# Patient Record
Sex: MALE | URBAN - NONMETROPOLITAN AREA
[De-identification: names, ages, dates, MRNs, and addresses within clinical notes are randomized per-mention and may not be internally consistent; named-entity substitution may affect disease eponyms.]

---

## 2021-08-04 ENCOUNTER — APPOINTMENT (RX ONLY)
Dept: URBAN - NONMETROPOLITAN AREA CLINIC 4 | Facility: CLINIC | Age: 66
Setting detail: DERMATOLOGY
End: 2021-08-04

## 2021-08-04 DIAGNOSIS — D17 BENIGN LIPOMATOUS NEOPLASM: ICD-10-CM

## 2021-08-04 PROBLEM — D17.0 BENIGN LIPOMATOUS NEOPLASM OF SKIN AND SUBCUTANEOUS TISSUE OF HEAD, FACE AND NECK: Status: ACTIVE | Noted: 2021-08-04

## 2021-08-04 PROCEDURE — ? COUNSELING

## 2021-08-04 PROCEDURE — ? DEFER

## 2021-08-04 PROCEDURE — 99202 OFFICE O/P NEW SF 15 MIN: CPT

## 2021-08-04 ASSESSMENT — LOCATION SIMPLE DESCRIPTION DERM: LOCATION SIMPLE: POSTERIOR NECK

## 2021-08-04 ASSESSMENT — LOCATION DETAILED DESCRIPTION DERM: LOCATION DETAILED: RIGHT SUPERIOR POSTERIOR NECK

## 2021-08-04 ASSESSMENT — LOCATION ZONE DERM: LOCATION ZONE: NECK

## 2021-08-04 NOTE — HPI: SKIN LESION (LIPOMA)
How Severe Is Your Lipoma?: mild
Is This A New Presentation, Or A Follow-Up?: Skin Lesion
Additional History: Patient is complaining of nerve pain in the area. This area changes size based on if it is inflamed.

## 2021-08-04 NOTE — PROCEDURE: DEFER
Introduction Text (Please End With A Colon): The following procedure was deferred:excision
Instructions (Optional): 9 cm Because of size and patient complaining of nerve pain in the area referring to Dr. Alejandra
Detail Level: Detailed
Reason To Defer Override: Refer to Dr alexander

## 2023-06-19 ENCOUNTER — OFFICE VISIT (OUTPATIENT)
Dept: URGENT CARE | Facility: CLINIC | Age: 68
End: 2023-06-19
Payer: COMMERCIAL

## 2023-06-19 VITALS
WEIGHT: 245 LBS | BODY MASS INDEX: 31.44 KG/M2 | DIASTOLIC BLOOD PRESSURE: 100 MMHG | TEMPERATURE: 98.7 F | HEART RATE: 90 BPM | RESPIRATION RATE: 20 BRPM | HEIGHT: 74 IN | OXYGEN SATURATION: 97 % | SYSTOLIC BLOOD PRESSURE: 170 MMHG

## 2023-06-19 DIAGNOSIS — R03.0 ELEVATED BLOOD PRESSURE READING: ICD-10-CM

## 2023-06-19 DIAGNOSIS — R42 DIZZINESS: Primary | ICD-10-CM

## 2023-06-19 DIAGNOSIS — M54.50 ACUTE RIGHT-SIDED LOW BACK PAIN, UNSPECIFIED WHETHER SCIATICA PRESENT: ICD-10-CM
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
## 2023-06-19 LAB — SL AMB POCT GLUCOSE BLD: 230

## 2023-06-19 PROCEDURE — 93005 ELECTROCARDIOGRAM TRACING: CPT

## 2023-06-19 PROCEDURE — 82948 REAGENT STRIP/BLOOD GLUCOSE: CPT

## 2023-06-19 PROCEDURE — S9088 SERVICES PROVIDED IN URGENT: HCPCS

## 2023-06-19 PROCEDURE — 99203 OFFICE O/P NEW LOW 30 MIN: CPT

## 2023-06-19 RX ORDER — GLIPIZIDE 10 MG/1
10 TABLET, FILM COATED, EXTENDED RELEASE ORAL DAILY
COMMUNITY

## 2023-06-19 RX ORDER — ASPIRIN 81 MG/1
81 TABLET ORAL DAILY
COMMUNITY

## 2023-06-19 RX ORDER — FLUTICASONE PROPIONATE 50 MCG
SPRAY, SUSPENSION (ML) NASAL
COMMUNITY
Start: 2023-03-18

## 2023-06-19 NOTE — PROGRESS NOTES
"  [address]        NAME: Aundrea Freire is a 75 y o  male  : 1955    MRN: [id number]  DATE: 2023  TIME: 3:10 PM    Assessment and Plan   Dizziness [R42]  1  Dizziness  POCT blood glucose    ECG 12 lead    Transfer to other facility      2  Elevated blood pressure reading  ECG 12 lead    Transfer to other facility      3  Fall, initial encounter  Transfer to other facility      4  Acute right-sided low back pain, unspecified whether sciatica present          Random blood glucose in the clinic was 230  EKG performed at 12:34 and shows heart rate of 88 bpm    MS, QT/QTc 388/469 ms,  MS   MS  Normal sinus rhythm, low voltage QRS  Family member if patient came to the clinic to pick him up and bring him to the ER  She states that he is acting his normal self and typically talks in a slower manner  Denies this being any change from baseline  Family members take him to the ER at this time  Patient Instructions       Go to the emergency room as instructed  Chief Complaint     Chief Complaint   Patient presents with   • Hypertension     Was  at pt and fell blood pressure was taken 190/110   Is having lower back pain due to fall   Dizziness noted          History of Present Illness       43-year-old male patient presents after being at physical therapy next-door with dizziness and fall  His blood pressure was taken at that time and was 190/110  Blood pressure was 170/100 today in the clinic  PT states that since the fall, he has felt \"off\"  PT states that before physical therapy today, he felt \"great\"  PT states that dizziness has been a chronic issue for him  History of vertigo in the past   PT has history of chronic serous otitis media of the left ear  PT sees ENT regularly  PT states that this dizziness does not feel different than usual   He states he was walking into the clinic about 60 feet when he felt dizzy and fell backwards    Does not think he hit his " head   PT states he is now having right lower back pain  Worse with palpation and certain movements  PT states that he slightly felt the room spinning around him  Denies any history of cardiac disease  PT is a type II diabetic on glipizide and metformin, which he took this medication today  PT states he does not regularly check his blood glucose at home  Denies any fever, chills, chest pain, shortness of breath, changes in vision, headaches, numbness/tingling, abdominal pain, nausea, vomiting, diarrhea  PT states that he drove to physical therapy by himself  Came into the  in wheelchair due to dizziness/fall - walks without any assistance prior to today  Review of Systems   Review of Systems   Constitutional: Negative  HENT: Negative  Eyes: Negative  Respiratory: Negative  Cardiovascular: Negative  Gastrointestinal: Negative  Musculoskeletal: Positive for back pain  Skin: Negative  Neurological: Positive for dizziness  Negative for headaches  Current Medications       Current Outpatient Medications:   •  aspirin (ECOTRIN LOW STRENGTH) 81 mg EC tablet, Take 81 mg by mouth daily, Disp: , Rfl:   •  fluticasone (FLONASE) 50 mcg/act nasal spray, ADMINISTER TWO SPRAYS INTO EACH NOSTRIL IN THE MORNING, Disp: , Rfl:   •  glipiZIDE (GLUCOTROL XL) 10 mg 24 hr tablet, Take 10 mg by mouth daily, Disp: , Rfl:   •  LISINOPRIL PO, Take by mouth, Disp: , Rfl:   •  metFORMIN (GLUCOPHAGE) 500 mg tablet, Take 500 mg by mouth 2 (two) times a day with meals, Disp: , Rfl:     Current Allergies     Allergies as of 06/19/2023   • (No Known Allergies)            The following portions of the patient's history were reviewed and updated as appropriate: allergies, current medications, past family history, past medical history, past social history, past surgical history and problem list      Past Medical History:   Diagnosis Date   • Diabetes mellitus ([facility] [address] )    • Hypertension        History reviewed  "No pertinent surgical history  History reviewed  No pertinent family history  Medications have been verified  Objective   /100   Pulse 90   Temp 98 7 °F (37 1 °C)   Resp 20   Ht 6' 1 5\" (1 867 m)   Wt 111 kg (245 lb)   SpO2 97%   BMI 31 89 kg/m²        Physical Exam     Physical Exam  Constitutional:       General: He is awake  He is not in acute distress  Appearance: Normal appearance  He is not ill-appearing or diaphoretic  HENT:      Head: Normocephalic and atraumatic  Right Ear: Tympanic membrane, ear canal and external ear normal       Left Ear: Ear canal and external ear normal       Nose: Nose normal       Mouth/Throat:      Mouth: Mucous membranes are moist       Pharynx: Oropharynx is clear  No posterior oropharyngeal erythema  Eyes:      Extraocular Movements: Extraocular movements intact  Conjunctiva/sclera: Conjunctivae normal       Pupils: Pupils are equal, round, and reactive to light  Cardiovascular:      Rate and Rhythm: Normal rate and regular rhythm  Pulses: Normal pulses  Heart sounds: Normal heart sounds  No murmur heard  No friction rub  No gallop  Pulmonary:      Effort: Pulmonary effort is normal  No respiratory distress  Breath sounds: Normal breath sounds  No stridor  No wheezing, rhonchi or rales  Chest:      Chest wall: No tenderness  Musculoskeletal:      Cervical back: Normal range of motion and neck supple  No tenderness  Skin:     General: Skin is warm and dry  Capillary Refill: Capillary refill takes less than 2 seconds  Findings: No rash  Neurological:      General: No focal deficit present  Mental Status: He is alert, oriented to person, place, and time and easily aroused  GCS: GCS eye subscore is 4  GCS verbal subscore is 5  GCS motor subscore is 6  Cranial Nerves: Cranial nerves 2-12 are intact  Sensory: Sensation is intact  Motor: Motor function is intact  No weakness   " Coordination: Coordination is intact  Deep Tendon Reflexes: Reflexes are normal and symmetric  Psychiatric:         Mood and Affect: Mood normal          Behavior: Behavior normal  Behavior is cooperative

## 2023-06-20 LAB
ATRIAL RATE: 88 BPM
P AXIS: 58 DEGREES
PR INTERVAL: 150 MS
QRS AXIS: 50 DEGREES
QRSD INTERVAL: 114 MS
QT INTERVAL: 388 MS
QTC INTERVAL: 469 MS
T WAVE AXIS: 35 DEGREES
VENTRICULAR RATE: 88 BPM

## 2023-06-20 PROCEDURE — 93010 ELECTROCARDIOGRAM REPORT: CPT | Performed by: INTERNAL MEDICINE

## 2023-06-21 LAB — GLUCOSE SERPL-MCNC: 230 MG/DL (ref 65–140)
